# Patient Record
Sex: FEMALE | Race: WHITE | NOT HISPANIC OR LATINO | Employment: UNEMPLOYED | URBAN - METROPOLITAN AREA
[De-identification: names, ages, dates, MRNs, and addresses within clinical notes are randomized per-mention and may not be internally consistent; named-entity substitution may affect disease eponyms.]

---

## 2024-08-13 ENCOUNTER — HOSPITAL ENCOUNTER (EMERGENCY)
Facility: CLINIC | Age: 24
Discharge: HOME | End: 2024-08-13
Attending: EMERGENCY MEDICINE | Admitting: EMERGENCY MEDICINE
Payer: COMMERCIAL

## 2024-08-13 VITALS
HEART RATE: 70 BPM | HEIGHT: 63 IN | SYSTOLIC BLOOD PRESSURE: 98 MMHG | BODY MASS INDEX: 20.38 KG/M2 | OXYGEN SATURATION: 97 % | TEMPERATURE: 97.3 F | DIASTOLIC BLOOD PRESSURE: 52 MMHG | WEIGHT: 115 LBS | RESPIRATION RATE: 18 BRPM

## 2024-08-13 DIAGNOSIS — R51.9 HEADACHE: ICD-10-CM

## 2024-08-13 LAB
ALBUMIN SERPL BCG-MCNC: 4.6 G/DL (ref 3.5–5.2)
ALBUMIN UR-MCNC: NEGATIVE MG/DL
ALP SERPL-CCNC: 70 U/L (ref 40–150)
ALT SERPL W P-5'-P-CCNC: 15 U/L (ref 0–50)
ANION GAP SERPL CALCULATED.3IONS-SCNC: 7 MMOL/L (ref 7–15)
APPEARANCE UR: CLEAR
AST SERPL W P-5'-P-CCNC: 19 U/L (ref 0–45)
BILIRUB DIRECT SERPL-MCNC: <0.2 MG/DL (ref 0–0.3)
BILIRUB SERPL-MCNC: 0.3 MG/DL
BILIRUB UR QL STRIP: NEGATIVE
BUN SERPL-MCNC: 6.2 MG/DL (ref 6–20)
CALCIUM SERPL-MCNC: 9.9 MG/DL (ref 8.8–10.4)
CHLORIDE SERPL-SCNC: 100 MMOL/L (ref 98–107)
COLOR UR AUTO: COLORLESS
CREAT SERPL-MCNC: 0.68 MG/DL (ref 0.51–0.95)
EGFRCR SERPLBLD CKD-EPI 2021: >90 ML/MIN/1.73M2
ERYTHROCYTE [DISTWIDTH] IN BLOOD BY AUTOMATED COUNT: 13.1 % (ref 10–15)
FLUAV RNA SPEC QL NAA+PROBE: NEGATIVE
FLUBV RNA RESP QL NAA+PROBE: NEGATIVE
GLUCOSE SERPL-MCNC: 104 MG/DL (ref 70–99)
GLUCOSE UR STRIP-MCNC: NEGATIVE MG/DL
HCG UR QL: NEGATIVE
HCO3 SERPL-SCNC: 29 MMOL/L (ref 22–29)
HCT VFR BLD AUTO: 42.1 % (ref 35–47)
HGB BLD-MCNC: 14.2 G/DL (ref 11.7–15.7)
HGB UR QL STRIP: NEGATIVE
KETONES UR STRIP-MCNC: NEGATIVE MG/DL
LEUKOCYTE ESTERASE UR QL STRIP: NEGATIVE
LIPASE SERPL-CCNC: 20 U/L (ref 13–60)
MCH RBC QN AUTO: 29.5 PG (ref 26.5–33)
MCHC RBC AUTO-ENTMCNC: 33.7 G/DL (ref 31.5–36.5)
MCV RBC AUTO: 88 FL (ref 78–100)
NITRATE UR QL: NEGATIVE
PH UR STRIP: 7.5 [PH] (ref 5–7)
PLATELET # BLD AUTO: 260 10E3/UL (ref 150–450)
POTASSIUM SERPL-SCNC: 4.2 MMOL/L (ref 3.4–5.3)
PROT SERPL-MCNC: 7.1 G/DL (ref 6.4–8.3)
RBC # BLD AUTO: 4.81 10E6/UL (ref 3.8–5.2)
RBC URINE: 0 /HPF
RSV RNA SPEC NAA+PROBE: NEGATIVE
SARS-COV-2 RNA RESP QL NAA+PROBE: NEGATIVE
SODIUM SERPL-SCNC: 136 MMOL/L (ref 135–145)
SP GR UR STRIP: 1 (ref 1–1.03)
SQUAMOUS EPITHELIAL: 1 /HPF
UROBILINOGEN UR STRIP-MCNC: <2 MG/DL
WBC # BLD AUTO: 8.7 10E3/UL (ref 4–11)
WBC URINE: 0 /HPF

## 2024-08-13 PROCEDURE — 87637 SARSCOV2&INF A&B&RSV AMP PRB: CPT

## 2024-08-13 PROCEDURE — 84450 TRANSFERASE (AST) (SGOT): CPT

## 2024-08-13 PROCEDURE — 81025 URINE PREGNANCY TEST: CPT

## 2024-08-13 PROCEDURE — 85027 COMPLETE CBC AUTOMATED: CPT

## 2024-08-13 PROCEDURE — 96375 TX/PRO/DX INJ NEW DRUG ADDON: CPT

## 2024-08-13 PROCEDURE — 250N000011 HC RX IP 250 OP 636

## 2024-08-13 PROCEDURE — 83690 ASSAY OF LIPASE: CPT

## 2024-08-13 PROCEDURE — 99284 EMERGENCY DEPT VISIT MOD MDM: CPT | Mod: 25

## 2024-08-13 PROCEDURE — 96374 THER/PROPH/DIAG INJ IV PUSH: CPT

## 2024-08-13 PROCEDURE — 36415 COLL VENOUS BLD VENIPUNCTURE: CPT

## 2024-08-13 PROCEDURE — 82374 ASSAY BLOOD CARBON DIOXIDE: CPT

## 2024-08-13 PROCEDURE — 96361 HYDRATE IV INFUSION ADD-ON: CPT

## 2024-08-13 PROCEDURE — 96365 THER/PROPH/DIAG IV INF INIT: CPT

## 2024-08-13 PROCEDURE — 81001 URINALYSIS AUTO W/SCOPE: CPT

## 2024-08-13 PROCEDURE — 258N000003 HC RX IP 258 OP 636

## 2024-08-13 RX ORDER — KETOROLAC TROMETHAMINE 15 MG/ML
15 INJECTION, SOLUTION INTRAMUSCULAR; INTRAVENOUS ONCE
Status: COMPLETED | OUTPATIENT
Start: 2024-08-13 | End: 2024-08-13

## 2024-08-13 RX ORDER — DIPHENHYDRAMINE HYDROCHLORIDE 50 MG/ML
25 INJECTION INTRAMUSCULAR; INTRAVENOUS ONCE
Status: COMPLETED | OUTPATIENT
Start: 2024-08-13 | End: 2024-08-13

## 2024-08-13 RX ADMIN — PROCHLORPERAZINE EDISYLATE 10 MG: 5 INJECTION INTRAMUSCULAR; INTRAVENOUS at 14:05

## 2024-08-13 RX ADMIN — SODIUM CHLORIDE 1000 ML: 9 INJECTION, SOLUTION INTRAVENOUS at 13:29

## 2024-08-13 RX ADMIN — KETOROLAC TROMETHAMINE 15 MG: 15 INJECTION, SOLUTION INTRAMUSCULAR; INTRAVENOUS at 14:00

## 2024-08-13 RX ADMIN — DIPHENHYDRAMINE HYDROCHLORIDE 25 MG: 50 INJECTION INTRAMUSCULAR; INTRAVENOUS at 14:02

## 2024-08-13 ASSESSMENT — COLUMBIA-SUICIDE SEVERITY RATING SCALE - C-SSRS
1. IN THE PAST MONTH, HAVE YOU WISHED YOU WERE DEAD OR WISHED YOU COULD GO TO SLEEP AND NOT WAKE UP?: NO
2. HAVE YOU ACTUALLY HAD ANY THOUGHTS OF KILLING YOURSELF IN THE PAST MONTH?: NO
6. HAVE YOU EVER DONE ANYTHING, STARTED TO DO ANYTHING, OR PREPARED TO DO ANYTHING TO END YOUR LIFE?: NO

## 2024-08-13 ASSESSMENT — ACTIVITIES OF DAILY LIVING (ADL)
ADLS_ACUITY_SCORE: 35
ADLS_ACUITY_SCORE: 35

## 2024-08-13 NOTE — DISCHARGE INSTRUCTIONS
You were seen for evaluation of headache, nausea, vomiting, loose stools.  Thankfully, there are no signs of problems with the liver, pancreas.  No urine infection.  You do not have COVID, RSV, or influenza.    You may take 600 mg of ibuprofen (Advil) every 6 hours and 1000 mg acetaminophen (Tylenol) every 6 hours for pain. Do not take more than 3200 mg of ibuprofen (Advil) in 24 hours. Do not take more than 4000 mg of acetaminophen (Tylenol) in 24 hours. You may take this much for 1-3 days, then only use as needed.     I referred you to neurology for management of your headaches. [facility] will call you to coordinate your care as prescribed by your provider. If you don't hear from a representative within 2 business days, please call [phone].    I referred you to primary care.  Please call them today or tomorrow to schedule an ER follow-up and to establish care.    Return to the emergency department for sudden onset of severe headache, new/worsening vision changes, weakness on one side of the body, vomiting blood, or any other concerning symptoms.

## 2024-08-13 NOTE — ED PROVIDER NOTES
"Emergency Department Midlevel Supervisory Note     I had a face to face encounter with this patient seen by the Advanced Practice Provider (HAILEY). I personally made/approved the management plan and take responsibility for the patient management. I personally saw patient and performed a substantive portion of the visit including all aspects of the medical decision making.     ED Course:  12:32 PM Leslye Gregorio PA-C staffed patient with me. I agree with their assessment and plan of management, and I will see the patient.  12:41 PM I met with the patient to introduce myself, gather additional history, perform my initial exam, and discuss the plan.   2:44 PM Labs all reassuring.    Brief HPI:     Shari Falcon is a 24 year old female who presents for evaluation of headache. 4 days of constant frontal and occipital headache. The headache is familiar to her. Use of Tylenol without relief. Intermittent chills, epigastric abdominal pain, 1 episode vomiting today, 1 episode loose stool yesterday.    I, Aydin Coronado, am serving as a scribe to document services personally performed by Dwayne Bangura DO, based on my observations and the provider's statements to me.   I, Dwayne Bangura DO, attest that Aydin Coronado was acting in a scribe capacity, has observed my performance of the services and has documented them in accordance with my direction.    Brief Physical Exam: /68   Pulse 63   Temp 97.3  F (36.3  C) (Temporal)   Resp 18   Ht 1.6 m (5' 3\")   Wt 52.2 kg (115 lb)   LMP 08/06/2024 (Approximate)   SpO2 98%   BMI 20.37 kg/m    Physical Exam  Vitals and nursing note reviewed.   Constitutional:       General: She is not in acute distress.     Appearance: Normal appearance.   HENT:      Head: Normocephalic and atraumatic.      Mouth/Throat:      Mouth: Mucous membranes are moist.   Eyes:      Extraocular Movements: Extraocular movements intact.      Pupils: Pupils are equal, round, and reactive to light. "      Comments: No vertical or bidirectional nystagmus   Neck:      Comments: No meningismus.  No rash  Cardiovascular:      Rate and Rhythm: Normal rate and regular rhythm.   Pulmonary:      Effort: Pulmonary effort is normal. No respiratory distress.      Breath sounds: Normal breath sounds.   Abdominal:      General: There is no distension.      Palpations: Abdomen is soft.      Tenderness: There is no abdominal tenderness.   Musculoskeletal:         General: Normal range of motion.      Cervical back: Normal range of motion.   Skin:     General: Skin is warm.      Capillary Refill: Capillary refill takes less than 2 seconds.   Neurological:      Mental Status: She is alert and oriented to person, place, and time.      Sensory: Sensation is intact.      Motor: Motor function is intact.      Comments: Fluent speech, no facial asymmetry or pronator drift, 5/5 strength b/l UE/LE, normal finger to nose b/l            MDM:  Pt seen in conjunction with HAILEY Leslye Gregorio.  Pt here with BERNSTEIN since Saturday, similar to previous, gradual in onset without neuro symptoms/findings.  No indication for emergent CT or MRI brain.  Pt also reporting some fever/chills, epigastric pain with v/d.  Overall benign abdomen. Agree with initial labs, symptomatic treatment and reassessment.         1. Headache        Consults:  none    Labs and Imaging:  Results for orders placed or performed during the hospital encounter of 08/13/24   UA with Microscopic reflex to Culture    Specimen: Urine, Clean Catch   Result Value Ref Range    Color Urine Colorless Colorless, Straw, Light Yellow, Yellow    Appearance Urine Clear Clear    Glucose Urine Negative Negative mg/dL    Bilirubin Urine Negative Negative    Ketones Urine Negative Negative mg/dL    Specific Gravity Urine 1.003 1.001 - 1.030    Blood Urine Negative Negative    pH Urine 7.5 (H) 5.0 - 7.0    Protein Albumin Urine Negative Negative mg/dL    Urobilinogen Urine <2.0 <2.0 mg/dL    Nitrite  Urine Negative Negative    Leukocyte Esterase Urine Negative Negative    RBC Urine 0 <=2 /HPF    WBC Urine 0 <=5 /HPF    Squamous Epithelials Urine 1 <=1 /HPF   CBC with platelets   Result Value Ref Range    WBC Count 8.7 4.0 - 11.0 10e3/uL    RBC Count 4.81 3.80 - 5.20 10e6/uL    Hemoglobin 14.2 11.7 - 15.7 g/dL    Hematocrit 42.1 35.0 - 47.0 %    MCV 88 78 - 100 fL    MCH 29.5 26.5 - 33.0 pg    MCHC 33.7 31.5 - 36.5 g/dL    RDW 13.1 10.0 - 15.0 %    Platelet Count 260 150 - 450 10e3/uL   Basic metabolic panel   Result Value Ref Range    Sodium 136 135 - 145 mmol/L    Potassium 4.2 3.4 - 5.3 mmol/L    Chloride 100 98 - 107 mmol/L    Carbon Dioxide (CO2) 29 22 - 29 mmol/L    Anion Gap 7 7 - 15 mmol/L    Urea Nitrogen 6.2 6.0 - 20.0 mg/dL    Creatinine 0.68 0.51 - 0.95 mg/dL    GFR Estimate >90 >60 mL/min/1.73m2    Calcium 9.9 8.8 - 10.4 mg/dL    Glucose 104 (H) 70 - 99 mg/dL   Hepatic function panel   Result Value Ref Range    Protein Total 7.1 6.4 - 8.3 g/dL    Albumin 4.6 3.5 - 5.2 g/dL    Bilirubin Total 0.3 <=1.2 mg/dL    Alkaline Phosphatase 70 40 - 150 U/L    AST 19 0 - 45 U/L    ALT 15 0 - 50 U/L    Bilirubin Direct <0.20 0.00 - 0.30 mg/dL   Result Value Ref Range    Lipase 20 13 - 60 U/L   Symptomatic Influenza A/B, RSV, & SARS-CoV2 PCR (COVID-19) Nasopharyngeal    Specimen: Nasopharyngeal; Swab   Result Value Ref Range    Influenza A PCR Negative Negative    Influenza B PCR Negative Negative    RSV PCR Negative Negative    SARS CoV2 PCR Negative Negative   HCG qualitative urine   Result Value Ref Range    hCG Urine Qualitative Negative Negative       I have reviewed the relevant laboratory studies above.    I independently interpreted the following imaging study(s):   none    EKG: I reviewed and independently interpreted the patient's EKG, with comments made as listed below. Please see scanned EKG for full report.   none    Procedures:  I was present for the key portions of procedures documented in  HAILEY/midlevel note, see midlevel note for further details.    Dwayne Bangura DO  [facility] EMERGENCY ROOM  [address] [facility] [address]  [phone]     Dwayne Bangura MD  08/13/24 9535

## 2024-08-13 NOTE — ED TRIAGE NOTES
Pt presents to the ED with c/o HA, fatigue, and feelings of fever/chills since Saturday. Endorses N/V/D.      Triage Assessment (Adult)       Row Name 08/13/24 1151          Triage Assessment    Airway WDL WDL        Respiratory WDL    Respiratory WDL WDL        Skin Circulation/Temperature WDL    Skin Circulation/Temperature WDL WDL        Cardiac WDL    Cardiac WDL WDL        Peripheral/Neurovascular WDL    Peripheral Neurovascular WDL WDL        Cognitive/Neuro/Behavioral WDL    Cognitive/Neuro/Behavioral WDL WDL

## 2024-08-13 NOTE — ED NOTES
Pt dc'd in wheelchair to front entrance.  Instructed to call for ride if she feels too tired to drive, but she stated she was okay.

## 2024-08-13 NOTE — ED PROVIDER NOTES
Emergency Department Encounter   NAME: Shari Falcon  AGE: 24 year old female  YOB: 2000  MRN: [id number]    PCP: No Ref-Primary, Physician  ED PROVIDER: Leslye Gregorio PA-C    Evaluation Date & Time:   No admission date for patient encounter.    CHIEF COMPLAINT:  Headache      Impression and Plan   MDM: 24-year-old female with no pertinent history presents for evaluation of headache.  Headache started 4 days ago and was gradual in onset.  This headache is familiar to her.  No fever, neck pain, vision change, weakness, head trauma, blood thinner use.  Also notes intermittent chills, fatigue, nausea.  1 episode of nonbloody emesis this morning.  1 episode of loose stool yesterday.  Intermittent epigastric pain. No dysuria, flank pain, fever.  On arrival here patient is vitally stable.  Afebrile.  On my examination patient is in no acute distress.  She is ambulating with a smooth gait.  No nuchal rigidity.  No focal neurologic deficit.  Mild epigastric tenderness palpation without any rigidity or guarding.  Abdomen is otherwise soft and nontender.  No red flag symptoms for skull fracture, intracranial hemorrhage.  Examination is not consistent with CVA.  I have low suspicion for acute intracranial process, do not think that head imaging is indicated today which the patient is agreeable to this.  Differential includes viral infection, gastroenteritis, pancreatitis, cholecystitis, UTI, pyelonephritis, migraine, tension headache, cluster headache.  Examination is not consistent with mesenteric ischemia, bowel obstruction.  Low suspicion for acute aortic pathology based on history and exam with good peripheral pulses.  We discussed plans for labs and symptom control which patient is agreeable to.    Reassuring lab work here.  No leukocytosis concerning for ongoing systemic infection or inflammatory response.  No anemia.  No electrolyte abnormality, acidosis, ALEX.  Normal LFTs, hepatobiliary obstruction  is unlikely.  Normal lipase, pancreatitis is unlikely.  Negative pregnancy test.  UA without signs of infection.  Negative COVID, flu, RSV swab.  Given reassuring lab work and overall benign abdominal exam, I do not think that any abdominal imaging is indicated today.  I discussed this with the patient who is agreeable.    On reassessment, patient is feeling much better after fluids, Compazine, Benadryl, and Toradol.  We reviewed all lab results. She reports that she has been getting these headaches more frequently over the last several months.  She states these are migraines, but has never been formally evaluated for migraines.  We discussed referral to neurology which she is agreeable and I have placed for her.  She also does not have a primary care provider, so I placed this referral for her.  I encouraged fever to call the clinic and schedule ER follow-up in the next 3 to 4 days.  We reviewed OTC pain control.  We reviewed strict return precautions and patient was discharged home in stable condition.    I have staffed the patient with Dr. Bangura, ED physician, who will evaluate the patient and agrees with all aspects of today's care.          Medical Decision Making  Obtained supplemental history:Supplemental history obtained?: No  Reviewed external records: External records reviewed?: No  Care impacted by chronic illness:N/A  Care significantly affected by social determinants of health:N/A  Did you consider but not order tests?: Work up considered but not performed and documented in chart, if applicable  Did you interpret images independently?: Independent interpretation of ECG and images noted in documentation, when applicable.  Consultation discussion with other provider:Did you involve another provider (consultant, , pharmacy, etc.)?: No  Discharge. No recommendations on prescription strength medication(s). N/A.   At the conclusion of the encounter I discussed the results of all the tests and the  disposition. The questions were answered. The patient or family acknowledged understanding and was agreeable with the care plan.    FINAL IMPRESSION:    ICD-10-CM    1. Headache  R51.9 Adult Neurology  Referral     Primary Care Referral            MEDICATIONS GIVEN IN THE EMERGENCY DEPARTMENT:  Medications   sodium chloride 0.9% BOLUS 1,000 mL (0 mLs Intravenous Stopped 8/13/24 1436)   prochlorperazine (COMPAZINE) injection 10 mg (10 mg Intravenous $Given 8/13/24 1405)   ketorolac (TORADOL) injection 15 mg (15 mg Intravenous $Given 8/13/24 1400)   diphenhydrAMINE (BENADRYL) injection 25 mg (25 mg Intravenous $Given 8/13/24 1402)         NEW PRESCRIPTIONS STARTED AT TODAY'S ED VISIT:  There are no discharge medications for this patient.        HPI   Patient information was obtained from: patient   Use of Intrepreter: N/A     Shari Falcon is a 24 year old female with no pertinent history who presents to the ED by car for evaluation of headache.  She has had a frontal and occipital headache since Saturday, 4 days ago.  This has been constant.  This headache is familiar to her and was gradual in onset.  She denies any fever, neck pain, neck stiffness, vision changes, weakness.  She has been taking Tylenol for this headache without much relief.  She also notes intermittently feeling chilled, epigastric pain, 1 episode of vomiting today, and 1 episode of loose stool yesterday.  No dysuria, flank pain, fever.      REVIEW OF SYSTEMS:  Pertinent positive and negative symptoms per HPI.       Medical History     No past medical history on file.    No past surgical history on file.    No family history on file.         No current outpatient medications on file.        Physical Exam     First Vitals:  Patient Vitals for the past 24 hrs:   BP Temp Temp src Pulse Resp SpO2 Height Weight   08/13/24 1500 98/52 -- -- 70 -- 97 % -- --   08/13/24 1154 -- -- -- -- -- 98 % -- --   08/13/24 1150 101/68 97.3  F (36.3  C)  "Temporal 63 18 -- 1.6 m (5' 3\") 52.2 kg (115 lb)       PHYSICAL EXAM:   General Appearance:  Alert, cooperative, no distress, appears stated age  HENT: Normocephalic without obvious deformity, atraumatic. Mucous membranes moist   Eyes: Conjunctiva clear, Lids normal. No discharge. EOM intact. Pupils equal and reactive to light bilaterally. No nystagmus.   Respiratory: No distress. Lungs clear to ausculation bilaterally. No wheezes, rhonchi or stridor  Cardiovascular: Regular rate and rhythm, no murmur. Normal cap refill. No peripheral edema  GI: Mild epigastric tenderness to palpation without any rigidity or guarding.  Abdomen is otherwise soft and nontender.  : No CVA tenderness  Musculoskeletal: Moving all extremities. No gross deformities.  Full neck range of motion.  Integument: Warm, dry, no rashes or lesions  Neurologic: Alert and orientated x3.  GCS 15.  Cranial nerves III through XII intact.  No pronator drift.  Normal heel-to-shin.  Ambulating with a smooth gait.  Psych: Normal mood and affect      Results     LAB:  All pertinent labs reviewed and interpreted  Labs Ordered and Resulted from Time of ED Arrival to Time of ED Departure   ROUTINE UA WITH MICROSCOPIC REFLEX TO CULTURE - Abnormal       Result Value    Color Urine Colorless      Appearance Urine Clear      Glucose Urine Negative      Bilirubin Urine Negative      Ketones Urine Negative      Specific Gravity Urine 1.003      Blood Urine Negative      pH Urine 7.5 (*)     Protein Albumin Urine Negative      Urobilinogen Urine <2.0      Nitrite Urine Negative      Leukocyte Esterase Urine Negative      RBC Urine 0      WBC Urine 0      Squamous Epithelials Urine 1     BASIC METABOLIC PANEL - Abnormal    Sodium 136      Potassium 4.2      Chloride 100      Carbon Dioxide (CO2) 29      Anion Gap 7      Urea Nitrogen 6.2      Creatinine 0.68      GFR Estimate >90      Calcium 9.9      Glucose 104 (*)    CBC WITH PLATELETS - Normal    WBC Count 8.7   "    RBC Count 4.81      Hemoglobin 14.2      Hematocrit 42.1      MCV 88      MCH 29.5      MCHC 33.7      RDW 13.1      Platelet Count 260     HEPATIC FUNCTION PANEL - Normal    Protein Total 7.1      Albumin 4.6      Bilirubin Total 0.3      Alkaline Phosphatase 70      AST 19      ALT 15      Bilirubin Direct <0.20     LIPASE - Normal    Lipase 20     INFLUENZA A/B, RSV, & SARS-COV2 PCR - Normal    Influenza A PCR Negative      Influenza B PCR Negative      RSV PCR Negative      SARS CoV2 PCR Negative     HCG QUALITATIVE URINE - Normal    hCG Urine Qualitative Negative         RADIOLOGY:  No orders to display       Leslye Gregorio PA-C   Emergency Medicine   [facility] EMERGENCY ROOM       Leslye Gregorio PA-C  08/13/24 0897

## 2025-07-10 ENCOUNTER — APPOINTMENT (OUTPATIENT)
Dept: RADIOLOGY | Facility: CLINIC | Age: 25
End: 2025-07-10
Payer: COMMERCIAL

## 2025-07-10 ENCOUNTER — HOSPITAL ENCOUNTER (EMERGENCY)
Facility: CLINIC | Age: 25
Discharge: HOME | End: 2025-07-10
Payer: COMMERCIAL

## 2025-07-10 VITALS
WEIGHT: 120 LBS | HEART RATE: 76 BPM | BODY MASS INDEX: 21.26 KG/M2 | DIASTOLIC BLOOD PRESSURE: 63 MMHG | SYSTOLIC BLOOD PRESSURE: 109 MMHG | OXYGEN SATURATION: 95 % | RESPIRATION RATE: 16 BRPM | TEMPERATURE: 98.4 F | HEIGHT: 63 IN
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
## 2025-07-10 DIAGNOSIS — M25.552 HIP PAIN, LEFT: ICD-10-CM

## 2025-07-10 PROCEDURE — 250N000011 HC RX IP 250 OP 636

## 2025-07-10 PROCEDURE — 250N000013 HC RX MED GY IP 250 OP 250 PS 637

## 2025-07-10 PROCEDURE — 73502 X-RAY EXAM HIP UNI 2-3 VIEWS: CPT

## 2025-07-10 PROCEDURE — 96372 THER/PROPH/DIAG INJ SC/IM: CPT

## 2025-07-10 PROCEDURE — 72220 X-RAY EXAM SACRUM TAILBONE: CPT

## 2025-07-10 PROCEDURE — 99284 EMERGENCY DEPT VISIT MOD MDM: CPT

## 2025-07-10 RX ORDER — CYCLOBENZAPRINE HCL 10 MG
10 TABLET ORAL 3 TIMES DAILY PRN
Qty: 21 TABLET | Refills: 0 | Status: SHIPPED | OUTPATIENT
Start: 2025-07-10 | End: 2025-07-17

## 2025-07-10 RX ORDER — ACETAMINOPHEN 325 MG/1
975 TABLET ORAL ONCE
Status: COMPLETED | OUTPATIENT
Start: 2025-07-10 | End: 2025-07-10

## 2025-07-10 RX ORDER — KETOROLAC TROMETHAMINE 15 MG/ML
15 INJECTION, SOLUTION INTRAMUSCULAR; INTRAVENOUS ONCE
Status: COMPLETED | OUTPATIENT
Start: 2025-07-10 | End: 2025-07-10

## 2025-07-10 RX ADMIN — ACETAMINOPHEN 975 MG: 325 TABLET ORAL at 15:18

## 2025-07-10 RX ADMIN — KETOROLAC TROMETHAMINE 15 MG: 15 INJECTION, SOLUTION INTRAMUSCULAR; INTRAVENOUS at 15:19

## 2025-07-10 ASSESSMENT — ACTIVITIES OF DAILY LIVING (ADL)
ADLS_ACUITY_SCORE: 41
ADLS_ACUITY_SCORE: 41

## 2025-07-10 ASSESSMENT — COLUMBIA-SUICIDE SEVERITY RATING SCALE - C-SSRS
1. IN THE PAST MONTH, HAVE YOU WISHED YOU WERE DEAD OR WISHED YOU COULD GO TO SLEEP AND NOT WAKE UP?: NO
6. HAVE YOU EVER DONE ANYTHING, STARTED TO DO ANYTHING, OR PREPARED TO DO ANYTHING TO END YOUR LIFE?: NO
2. HAVE YOU ACTUALLY HAD ANY THOUGHTS OF KILLING YOURSELF IN THE PAST MONTH?: NO

## 2025-07-10 NOTE — ED TRIAGE NOTES
Sunday was playing around with sister and went to jump onto sister face to face, jumped thinking sister would catch her, but she did not.  Fell backwards on a water/slippery surface landing on her left back/hip area.  Unknown if she hit head.       Triage Assessment (Adult)       Row Name 07/10/25 1426          Triage Assessment    Airway WDL WDL        Respiratory WDL    Respiratory WDL WDL        Skin Circulation/Temperature WDL    Skin Circulation/Temperature WDL WDL  no bruising notes [facility]        Cardiac WDL    Cardiac WDL WDL     Cardiac Rhythm NSR        Peripheral/Neurovascular WDL    Peripheral Neurovascular WDL WDL        Cognitive/Neuro/Behavioral WDL    Cognitive/Neuro/Behavioral WDL WDL

## 2025-07-10 NOTE — DISCHARGE INSTRUCTIONS
Your x-rays today did not show any fractures or broken bones.  You can continue to take Tylenol and ibuprofen at home for pain and discomfort.  If you are continuing to have pain that is not improving, would recommend follow-up with your primary care doctor.    I do not see that you have an so I placed a referral for you.  If you feel like the pain is getting a lot worse, you are unable to walk, come back here for evaluation.

## 2025-07-10 NOTE — ED PROVIDER NOTES
Emergency Department Encounter   NAME: Shari Falcon ; AGE: 25 year old female ; YOB: 2000 ; MRN: [id number] ; PCP: No Ref-Primary, Physician   ED PROVIDER: Judi Rayo PA-C    Evaluation Date & Time:   7/10/2025  2:36 PM    CHIEF COMPLAINT:  Fall (On Sunday)      Impression and Plan   MDM: Shari Falcon is a 25 year old female who presents to the ED for evaluation of fall.  Patient reports on Sunday (5 days ago) she was playing outside on a wet surface and slipped and fell backwards.  She has a tough time recalling how she landed however felt like she landed on her left hip and leg.  Does not believe she hit her head.  No loss of consciousness, vomiting, blood thinners.  Since the accident, she has noted pain to her left hip and lower back area.  Has not tried anything at home for pain.  Denies weakness or numbness in the extremities although states it has been hard to put on pants due to back pain.Denies bowel or bladder changes.  On exam she is comfortably sitting in bed. Vitally normal. She has no reproducible tenderness, bruising, step offs to the mid spine. She has minimal reproducible tenderness to the thoracic musculature.  No tenderness to the posterior, lateral, anterior left-sided ribs.No reproducible tenderness to the left hip or lower extremity.     Differential considered including acute spinal fracture, rib fracture, hip or pelvic fracture, tailbone fracture, spinal cord injury, msk injury.   XRAY of hip and sacrum negative. Did consider spinal imaging however patient completely nontender on exam. No red flag neurologic symptoms.   Patient given tylenol and toradol. Feeling better. She is ambulating throughout department without difficulty. Will send her with some flexeril to use at home as needed as well as should use tylenol and ibuprofen.     Suspect musculoskeletal cause of pain.   Discussed acute worsening concerns as return precautions otherwise follow up with PCP.  Referral placed.   Patient expresses understanding and is discharged in stable condition.      Medical Decision Making      Discharge. I prescribed additional prescription strength medication(s) as charted. See documentation for any additional details.    MIPS (CTPE, Dental pain, Martínez, Sinusitis, Asthma/COPD, Head Trauma): Not Applicable    SEPSIS: None  Critical Care: 0    ED COURSE:  3:07 PM I met and introduced myself to the patient. I gathered initial history and performed my physical exam. We discussed plan for initial workup.    I rechecked the patient and discussed results, discharge, follow up, and reasons to return to the ED.     At the conclusion of the encounter I discussed the results of all the tests and the disposition. The questions were answered. The patient or family acknowledged understanding and was agreeable with the care plan.    FINAL IMPRESSION:    ICD-10-CM    1. Fall at home, initial encounter  W19.XXXA     Y92.009       2. Hip pain, left  M25.552             MEDICATIONS GIVEN IN THE EMERGENCY DEPARTMENT:  Medications   ketorolac (TORADOL) injection 15 mg (15 mg Intramuscular $Given 7/10/25 8168)   acetaminophen (TYLENOL) tablet 975 mg (975 mg Oral $Given 7/10/25 3838)         NEW PRESCRIPTIONS STARTED AT TODAY'S ED VISIT:  Discharge Medication List as of 7/10/2025  4:12 PM        START taking these medications    Details   cyclobenzaprine (FLEXERIL) 10 MG tablet Take 1 tablet (10 mg) by mouth 3 times daily as needed for muscle spasms., Disp-21 tablet, R-0, E-Prescribe               HPI   Use of Intrepreter: N/A     Shari DIGNA Falcon is a 25 year old female with a pertinent history of none who presents to the ED by private vehicle for evaluation of fall.     Medical History     No past medical history on file.    No past surgical history on file.    No family history on file.         cyclobenzaprine (FLEXERIL) 10 MG tablet          Physical Exam     First Vitals:  Patient Vitals for the  "past 24 hrs:   BP Temp Temp src Pulse Resp SpO2 Height Weight   07/10/25 1436 109/63 98.4  F (36.9  C) Oral 76 16 95 % -- --   07/10/25 1434 -- -- -- -- -- -- 1.6 m (5' 3\") 54.4 kg (120 lb)         PHYSICAL EXAM:   Physical Exam    Constitutional: alert,  no acute distress,  not ill-appearing  Head: normocephalic, atraumatic  Eyes: EOM intact   Neck: ROM normal  Cardio: regular rate, no chest tenderness  Pulmonary: effort normal   Abdominal: flat, no distention, no abdominal bruising or tenderness   MSK:   - R sided ribs: nontender  - Back/spine: nontender to mid spine, mild tenderness to thoracic musculature without any overlying skin changes  - R hip: nontender to right hip, normal Rom of RLE   Skin: no visible rashes or erythema   Neuro: no gross focal deficit, acting per baseline, normal strength and sensation of LE, normal ambulation   Psychiatric: mood and behavior within normal limit    Results     LAB:  All pertinent labs reviewed and interpreted  Labs Ordered and Resulted from Time of ED Arrival to Time of ED Departure - No data to display     RADIOLOGY:  XR Hip Left 2-3 Views   Final Result   IMPRESSION: No acute displaced fracture or subluxation. Joint spaces are preserved.      XR Sacrum and Coccyx 2 Views   Final Result   IMPRESSION: No acute displaced fracture or subluxation. Joint spaces are preserved.        PROCEDURES:  None     Judi Rayo PA-C   Emergency Medicine   [facility] EMERGENCY ROOM       Judi Rayo PA-C  07/10/25 1640    "